# Patient Record
Sex: FEMALE | Race: WHITE | NOT HISPANIC OR LATINO | URBAN - METROPOLITAN AREA
[De-identification: names, ages, dates, MRNs, and addresses within clinical notes are randomized per-mention and may not be internally consistent; named-entity substitution may affect disease eponyms.]

---

## 2018-02-26 ENCOUNTER — EMERGENCY (EMERGENCY)
Facility: HOSPITAL | Age: 73
LOS: 1 days | Discharge: HOME | End: 2018-02-26
Attending: EMERGENCY MEDICINE | Admitting: EMERGENCY MEDICINE
Payer: COMMERCIAL

## 2018-02-26 VITALS
TEMPERATURE: 99 F | SYSTOLIC BLOOD PRESSURE: 163 MMHG | DIASTOLIC BLOOD PRESSURE: 53 MMHG | OXYGEN SATURATION: 100 % | WEIGHT: 173.06 LBS | RESPIRATION RATE: 17 BRPM | HEART RATE: 80 BPM | HEIGHT: 64 IN

## 2018-02-26 PROCEDURE — 99283 EMERGENCY DEPT VISIT LOW MDM: CPT

## 2018-02-26 RX ORDER — OFLOXACIN 0.3 %
1 DROPS OPHTHALMIC (EYE) ONCE
Qty: 0 | Refills: 0 | Status: COMPLETED | OUTPATIENT
Start: 2018-02-26 | End: 2018-02-26

## 2018-02-26 RX ADMIN — Medication 1 DROP(S): at 15:23

## 2018-02-26 NOTE — ED PROVIDER NOTE - PLAN OF CARE
Please follow up with an ophthalmologist within the next few days in regards to your symptoms.  The number for our ophthalmology clinic is [phone].  Please put 1-2 oflaxacin eye drops 4x a day for 5 days.  Please return to the ER for worsening of symptoms, development of fever or any concerns at all.

## 2018-02-26 NOTE — ED PROVIDER NOTE - CARE PLAN
Assessment and plan of treatment:	Please follow up with an ophthalmologist within the next few days in regards to your symptoms.  The number for our ophthalmology clinic is [phone].  Please put 1-2 oflaxacin eye drops 4x a day for 5 days.  Please return to the ER for worsening of symptoms, development of fever or any concerns at all. Principal Discharge DX:	Corneal abrasion, right, initial encounter  Assessment and plan of treatment:	Please follow up with an ophthalmologist within the next few days in regards to your symptoms.  The number for our ophthalmology clinic is [phone].  Please put 1-2 oflaxacin eye drops 4x a day for 5 days.  Please return to the ER for worsening of symptoms, development of fever or any concerns at all.

## 2018-02-26 NOTE — ED PROVIDER NOTE - OBJECTIVE STATEMENT
71 yo female with HLD presenting with right eye pain that started this morning.  feels like sand in her eye.  lots of tears and drainage with decreased vision.  started bothering her last night.  sharp, 6/10, worse with eye movement, improved with ice.  did not take anything for pain.  hx of cataracts but not operated. 71 yo female with HLD presenting with right eye pain that started this morning.  feels like sand in her eye.  lots of tears and drainage with decreased vision.  started bothering her last night.  sharp, 6/10, worse with eye movement, improved with ice.  did not take anything for pain.  hx of cataracts but not operated. no hx of trauma

## 2018-02-26 NOTE — ED PROVIDER NOTE - NS ED ROS FT
Constitutional: no fever  Eyes: + conjunctivitis  Ears: no ear pain   Nose: no nasal congestion, Mouth/Throat: no throat pain, Neck: no stiffness  Cardiovascular: no chest pain  Chest: no cough  Gastrointestinal: no abdominal pain, no vomiting and diarrhea  MSK: no joint pain  : no dysuria  Skin: no rash  Neuro: no LOC

## 2018-02-26 NOTE — ED PROVIDER NOTE - PHYSICAL EXAMINATION
pressure- right eye- 18 left eye- 21  fluorescin uptake over pupil of eye, no foreign body visualized   vision- 20/50 left eye 20/200 right eye pre tetracaine

## 2018-02-26 NOTE — ED PROVIDER NOTE - ATTENDING CONTRIBUTION TO CARE
73 yo female with HLD presenting with right eye pain improved with tetracaine found to have a central ulceration given abx drops, opth follow up. pos flourscein uptake.

## 2018-02-26 NOTE — ED PROVIDER NOTE - EYES, MLM
conjunctival injection in right eye with limbic sparing, right pupil reactive to light, extra ocular movements intact

## 2018-03-16 ENCOUNTER — EMERGENCY (EMERGENCY)
Facility: HOSPITAL | Age: 73
LOS: 1 days | Discharge: HOME | End: 2018-03-16
Attending: EMERGENCY MEDICINE
Payer: COMMERCIAL

## 2018-03-16 VITALS
HEIGHT: 62 IN | OXYGEN SATURATION: 97 % | RESPIRATION RATE: 18 BRPM | TEMPERATURE: 98 F | WEIGHT: 171.08 LBS | HEART RATE: 66 BPM | DIASTOLIC BLOOD PRESSURE: 84 MMHG | SYSTOLIC BLOOD PRESSURE: 161 MMHG

## 2018-03-16 VITALS
RESPIRATION RATE: 18 BRPM | TEMPERATURE: 98 F | DIASTOLIC BLOOD PRESSURE: 84 MMHG | HEART RATE: 61 BPM | SYSTOLIC BLOOD PRESSURE: 154 MMHG | OXYGEN SATURATION: 97 %

## 2018-03-16 PROCEDURE — 99282 EMERGENCY DEPT VISIT SF MDM: CPT

## 2018-03-16 NOTE — ED PROVIDER NOTE - OBJECTIVE STATEMENT
71yo female pt, ambulatory, no significant PMHx, c/o right eye irritation, blurry vision since this am. Pt woke up with pain this morning. Denies injuries. Pt stated she had corneal abrasion 2 weeks ago and txed with Ofloxacin. She did not f/u with ophthal because the symptom's improved after ofloxacin. Denies photophobia, headache or N/V. Denies itching or crusty discharge. Denies CP/SOB/ABD pain. Denies fever, chills or cough. Pt declined oral pain med.

## 2018-03-16 NOTE — ED PROVIDER NOTE - ATTENDING CONTRIBUTION TO CARE
Appears well and comfortable. Slight tearing and injection of R eye. No purulent drainage. Mild direct photophobia. No consensual photophobia. EOMsI s/ pain on movement and PsERRL. No fluoroscein uptake. Nothing clinically evident to suggest any emergent process. Discharged with appropriate instruction and follow-up.

## 2018-03-16 NOTE — ED ADULT TRIAGE NOTE - CHIEF COMPLAINT QUOTE
Pt stated that she has right eye abrasion last February 26, 2018, was here treated and released. Pt stated that she cannot get an appointment with an eye doctor

## 2022-07-22 NOTE — ED PROVIDER NOTE - CPE EDP NEURO NORM
Mastitis-Right breast pain, red, no fever    Hx mastitis on left then right breast. Last seen in office 6/10 for mastitis was prescribed dicloxacillin 500 mg every 6 hours for 10 days. Will route to TK for recommendation, and call pt. Pt verb understanding.
Pt calling to speak to a nurse about possible mastitis again. Please advise.
Sent my chart message to pt.  Prescribed dicloxacillin 500 mg for 10 days for mastitis of right breast.
normal...

## 2024-12-12 ENCOUNTER — APPOINTMENT (OUTPATIENT)
Dept: INTERNAL MEDICINE | Facility: CLINIC | Age: 79
End: 2024-12-12